# Patient Record
Sex: FEMALE
[De-identification: names, ages, dates, MRNs, and addresses within clinical notes are randomized per-mention and may not be internally consistent; named-entity substitution may affect disease eponyms.]

---

## 2021-03-05 ENCOUNTER — DASHBOARD ENCOUNTERS (OUTPATIENT)
Age: 36
End: 2021-03-05

## 2021-03-05 ENCOUNTER — OFFICE VISIT (OUTPATIENT)
Dept: URBAN - METROPOLITAN AREA CLINIC 92 | Facility: CLINIC | Age: 36
End: 2021-03-05
Payer: MEDICAID

## 2021-03-05 DIAGNOSIS — U07.1 COVID-19: ICD-10-CM

## 2021-03-05 DIAGNOSIS — R10.84 GENERALIZED ABDOMINAL PAIN: ICD-10-CM

## 2021-03-05 DIAGNOSIS — R11.0 NAUSEA: ICD-10-CM

## 2021-03-05 PROCEDURE — 99203 OFFICE O/P NEW LOW 30 MIN: CPT | Performed by: INTERNAL MEDICINE

## 2021-03-05 RX ORDER — NORETHINDRONE 0.35 MG/1
TABLET ORAL
Qty: 28 UNSPECIFIED | Status: ACTIVE | COMMUNITY

## 2021-03-05 RX ORDER — METRONIDAZOLE 500 MG/1
TABLET ORAL
Qty: 14 UNSPECIFIED | Status: ACTIVE | COMMUNITY

## 2021-03-05 NOTE — HPI-OTHER HISTORIES
This is a 36-year-old -American female presents today for consultation.  She had Covid infection in the beginning of January.  She was pregnant at the time and ultimately terminate a pregnancy at the end of January.  She notes since that time she has had generalized abdominal pain after eating.  This is associated with a general nausea feeling.  Her bowel movements are normal.  Prior to the above she had no gastrointestinal issues.  There is no blood in stool.

## 2021-03-09 ENCOUNTER — TELEPHONE ENCOUNTER (OUTPATIENT)
Dept: URBAN - METROPOLITAN AREA CLINIC 92 | Facility: CLINIC | Age: 36
End: 2021-03-09

## 2021-03-09 LAB — H PYLORI BREATH TEST: POSITIVE

## 2021-03-09 RX ORDER — NORETHINDRONE 0.35 MG/1
TABLET ORAL
Qty: 28 UNSPECIFIED | Status: ACTIVE | COMMUNITY

## 2021-03-09 RX ORDER — CLARITHROMYCIN 500 MG/1
1 TABLET TABLET, FILM COATED ORAL
Qty: 28 TABLET | OUTPATIENT
Start: 2021-03-09 | End: 2021-03-23

## 2021-03-09 RX ORDER — AMOXICILLIN 500 MG/1
2 CAPSULES CAPSULE ORAL
Qty: 56 CAPSULE | OUTPATIENT
Start: 2021-03-09 | End: 2021-03-23

## 2021-03-09 RX ORDER — OMEPRAZOLE 40 MG/1
1 CAPSULE 30 MINUTES BEFORE MORNING MEAL CAPSULE, DELAYED RELEASE ORAL ONCE A DAY
Qty: 30 | Refills: 0 | OUTPATIENT
Start: 2021-03-09

## 2021-03-09 RX ORDER — METRONIDAZOLE 500 MG/1
TABLET ORAL
Qty: 14 UNSPECIFIED | Status: ACTIVE | COMMUNITY

## 2021-04-05 ENCOUNTER — ERX REFILL RESPONSE (OUTPATIENT)
Dept: URBAN - METROPOLITAN AREA CLINIC 92 | Facility: CLINIC | Age: 36
End: 2021-04-05

## 2021-04-05 RX ORDER — OMEPRAZOLE 40 MG/1
1 CAPSULE 30 MINUTES BEFORE MORNING MEAL CAPSULE, DELAYED RELEASE ORAL ONCE A DAY
Qty: 30 | Refills: 0

## 2021-11-15 ENCOUNTER — P2P PATIENT RECORD (OUTPATIENT)
Age: 36
End: 2021-11-15